# Patient Record
Sex: MALE | Employment: FULL TIME | URBAN - METROPOLITAN AREA
[De-identification: names, ages, dates, MRNs, and addresses within clinical notes are randomized per-mention and may not be internally consistent; named-entity substitution may affect disease eponyms.]

---

## 2019-10-09 ENCOUNTER — CLINICAL SUPPORT (OUTPATIENT)
Dept: OTHER | Facility: CLINIC | Age: 39
End: 2019-10-09
Payer: COMMERCIAL

## 2019-10-09 DIAGNOSIS — Z00.8 ENCOUNTER FOR OTHER GENERAL EXAMINATION: ICD-10-CM

## 2019-10-09 PROCEDURE — 99401 PREV MED CNSL INDIV APPRX 15: CPT | Mod: S$GLB,,, | Performed by: INTERNAL MEDICINE

## 2019-10-09 PROCEDURE — 82947 PR  ASSAY QUANTITATIVE,BLOOD GLUCOSE: ICD-10-PCS | Mod: QW,S$GLB,, | Performed by: INTERNAL MEDICINE

## 2019-10-09 PROCEDURE — 80061 LIPID PANEL: CPT | Mod: QW,S$GLB,, | Performed by: INTERNAL MEDICINE

## 2019-10-09 PROCEDURE — 80061 PR  LIPID PANEL: ICD-10-PCS | Mod: QW,S$GLB,, | Performed by: INTERNAL MEDICINE

## 2019-10-09 PROCEDURE — 82947 ASSAY GLUCOSE BLOOD QUANT: CPT | Mod: QW,S$GLB,, | Performed by: INTERNAL MEDICINE

## 2019-10-09 PROCEDURE — 99401 PR PREVENT COUNSEL,INDIV,15 MIN: ICD-10-PCS | Mod: S$GLB,,, | Performed by: INTERNAL MEDICINE

## 2019-10-10 VITALS — HEIGHT: 65 IN

## 2019-10-10 LAB
HDLC SERPL-MCNC: 58 MG/DL
POC CHOLESTEROL, LDL (DOCK): 136 MG/DL
POC CHOLESTEROL, TOTAL: 212 MG/DL
POC GLUCOSE, FASTING: 85 MG/DL (ref 60–110)
TRIGL SERPL-MCNC: 90 MG/DL

## 2025-01-10 ENCOUNTER — OFFICE VISIT (OUTPATIENT)
Dept: ORTHOPEDICS | Facility: CLINIC | Age: 45
End: 2025-01-10
Payer: COMMERCIAL

## 2025-01-10 ENCOUNTER — HOSPITAL ENCOUNTER (OUTPATIENT)
Dept: RADIOLOGY | Facility: HOSPITAL | Age: 45
Discharge: HOME | End: 2025-01-10
Payer: COMMERCIAL

## 2025-01-10 VITALS — WEIGHT: 178 LBS | BODY MASS INDEX: 28.61 KG/M2 | HEIGHT: 66 IN

## 2025-01-10 DIAGNOSIS — M25.552 LEFT HIP PAIN: ICD-10-CM

## 2025-01-10 DIAGNOSIS — M70.62 GREATER TROCHANTERIC BURSITIS OF LEFT HIP: Primary | ICD-10-CM

## 2025-01-10 PROCEDURE — 73502 X-RAY EXAM HIP UNI 2-3 VIEWS: CPT | Mod: TC,LT

## 2025-01-10 PROCEDURE — 73502 X-RAY EXAM HIP UNI 2-3 VIEWS: CPT | Mod: 26,LT,, | Performed by: RADIOLOGY

## 2025-01-10 PROCEDURE — 99999 PR PBB SHADOW E&M-NEW PATIENT-LVL III: CPT | Mod: PBBFAC,,,

## 2025-01-10 RX ORDER — TRIAMCINOLONE ACETONIDE 40 MG/ML
40 INJECTION, SUSPENSION INTRA-ARTICULAR; INTRAMUSCULAR ONCE
Status: COMPLETED | OUTPATIENT
Start: 2025-01-10 | End: 2025-01-10

## 2025-01-10 RX ADMIN — TRIAMCINOLONE ACETONIDE 40 MG: 40 INJECTION, SUSPENSION INTRA-ARTICULAR; INTRAMUSCULAR at 11:01

## 2025-01-10 NOTE — PROGRESS NOTES
"  SUBJECTIVE:     Chief Complaint & History of Present Illness:  History of Present Illness    CHIEF COMPLAINT:  - Mr. Reddy presents today for initial evaluation of left hip pain that began Wednesday afternoon.    HPI:  Mr. Reddy presents with left hip pain that started on Wednesday afternoon around 2:30-3:00 PM. He initially noticed mild pain when exiting his truck but was still ambulatory. By Wednesday evening, the pain had intensified significantly, making weight-bearing difficult. He describes the pain as sharp and intense, radiating down his leg but not reaching his toes. He reports sensitivity to touch in the hip bone area. The pain has improved slightly, but he has been mostly sedentary. He denies any specific accident or injury preceding the onset of pain. He reports difficulty sleeping due to the pain and ambulating with a limp.    He experienced some numbness and tingling on Wednesday night when recumbent. He has been taking Advil for the pain but reports minimal relief. He works as an  and was previously able to move around without issues before Wednesday.     He denies any anterior groin pain and pain on the right side. He denies any history of back pain or back issues.  He advocates significant numbness and tingling radiating from his lower back down to his toes on the left side intermittently.    MEDICATIONS:  - Advil (ibuprofen): for pain, not really helping    WORK STATUS:  -  for a company  - Some level of mobility required for work      ROS:  Gastrointestinal: -constipation  Musculoskeletal: +joint pain  Neurological: +tingling          No past medical history on file.    No past surgical history on file.    No family history on file.    Review of patient's allergies indicates:  No Known Allergies      No current outpatient medications on file.      OBJECTIVE:     PHYSICAL EXAM:  Ht 5' 5.5" (1.664 m)   Wt 80.7 kg (178 lb)   BMI 29.17 kg/m²   General: " Pleasant, cooperative, NAD.  HEENT: NCAT, sclera nonicteric.  Lungs: Respirations are equal and unlabored.   Abdomen: Soft and non-tender.  CV: 2+ bilateral upper and lower extremity pulses.  Neuro: Sensation intact to light touch.  Skin: Intact throughout LE with no rashes, erythema, or lesions.  Extremities: No LE edema, NVI lower extremities. antalgic gait.    left Hip Exam:  TTP: Greater trochanter  120 degrees flexion  0 degrees extension   20 degrees internal rotation  30 degrees external rotation  30 degrees abduction  30 degrees adduction   0 flexion contracture   negative THAD   positive FADIR    Back Exam  full range of motion, no tenderness, palpable spasm or pain on motion    RADIOGRAPHS:  X-rays of the left hip taken today personally reviewed. Imaging reveals no acute fractures or dislocations.  Satisfactory preservation of joint spacing bilaterally.  Possible small area of calcification about the left greater trochanter..      ASSESSMENT:       ICD-10-CM ICD-9-CM   1. Greater trochanteric bursitis of left hip  M70.62 726.5   2. Left hip pain  M25.552 719.45       PLAN:     We discussed his findings and treatment options.  He has no overt deficits in strength or range of motion on exam today.  He has focal tenderness about the left lateral hip suggesting trochanteric bursitis.  He may have mild associated left-sided lumbar radicular symptoms.     Given his significant left lateral hip tenderness to palpation, he elected to undergo left greater trochanteric bursal injection in the office today.    Greater Trochanter Bursa Injection Procedure Note   Diagnosis: left greater trochanteric bursitis  Indications: left hip pain  Procedure Details: Verbal consent was obtained for the procedure. The injection site was identified and the skin was prepared with alcohol. The left hip was injected from a lateral approach with 1 ml of Kenalog and 4 ml Lidocaine under sterile technique using a 25 gauge 1 1/2 inch  needle. The needle was removed and the area cleansed and dressed.  Complications:  Patient tolerated the procedure well.    he was advised to rest the leg today, using ice and Tylenol as needed for comfort and swelling. he may have an increase in discomfort tonight followed by steady improvement over the next several days. It may take 1-3 weeks following the injection to get the full benefit of the medication.     He will follow up as needed for new or worsening symptoms.    If his symptoms persist or worsen, he could consider further workup for his lumbar spine, outpatient physical therapy for focused strengthening and range-of-motion exercises, or further imaging with MRI scan of the left hip.         This note was generated with the assistance of ambient listening technology. Verbal consent was obtained by the patient and accompanying visitor(s) for the recording of patient appointment to facilitate this note. I attest to having reviewed and edited the generated note for accuracy, though some syntax or spelling errors may persist. Please contact the author of this note for any clarification.         Andrés Conway PA-C